# Patient Record
Sex: FEMALE | Race: WHITE
[De-identification: names, ages, dates, MRNs, and addresses within clinical notes are randomized per-mention and may not be internally consistent; named-entity substitution may affect disease eponyms.]

---

## 2017-12-25 ENCOUNTER — HOSPITAL ENCOUNTER (EMERGENCY)
Dept: HOSPITAL 63 - ER | Age: 32
Discharge: HOME | End: 2017-12-25
Payer: OTHER GOVERNMENT

## 2017-12-25 VITALS — DIASTOLIC BLOOD PRESSURE: 78 MMHG | SYSTOLIC BLOOD PRESSURE: 113 MMHG

## 2017-12-25 VITALS — WEIGHT: 160 LBS | BODY MASS INDEX: 26.66 KG/M2 | HEIGHT: 65 IN

## 2017-12-25 DIAGNOSIS — Z91.040: ICD-10-CM

## 2017-12-25 DIAGNOSIS — K58.9: ICD-10-CM

## 2017-12-25 DIAGNOSIS — K51.911: Primary | ICD-10-CM

## 2017-12-25 LAB
ALBUMIN SERPL-MCNC: 4 G/DL (ref 3.4–5)
ALBUMIN/GLOB SERPL: 1.1 {RATIO} (ref 1–1.7)
ALP SERPL-CCNC: 80 U/L (ref 46–116)
ALT SERPL-CCNC: 29 U/L (ref 14–59)
ANION GAP SERPL CALC-SCNC: 8 MMOL/L (ref 6–14)
APTT PPP: YELLOW S
AST SERPL-CCNC: 15 U/L (ref 15–37)
BACTERIA #/AREA URNS HPF: (no result) /HPF
BASOPHILS # BLD AUTO: 0.1 X10^3/UL (ref 0–0.2)
BASOPHILS NFR BLD: 1 % (ref 0–3)
BILIRUB SERPL-MCNC: 0.6 MG/DL (ref 0.2–1)
BILIRUB UR QL STRIP: (no result)
BUN/CREAT SERPL: 16 (ref 6–20)
CA-I SERPL ISE-MCNC: 11 MG/DL (ref 7–20)
CALCIUM SERPL-MCNC: 9 MG/DL (ref 8.5–10.1)
CHLORIDE SERPL-SCNC: 105 MMOL/L (ref 98–107)
CO2 SERPL-SCNC: 27 MMOL/L (ref 21–32)
CREAT SERPL-MCNC: 0.7 MG/DL (ref 0.6–1)
EOSINOPHIL NFR BLD: 0 % (ref 0–3)
EOSINOPHIL NFR BLD: 0 X10^3/UL (ref 0–0.7)
ERYTHROCYTE [DISTWIDTH] IN BLOOD BY AUTOMATED COUNT: 12.6 % (ref 11.5–14.5)
FIBRINOGEN PPP-MCNC: (no result) MG/DL
GFR SERPLBLD BASED ON 1.73 SQ M-ARVRAT: 96.4 ML/MIN
GLOBULIN SER-MCNC: 3.7 G/DL (ref 2.2–3.8)
GLUCOSE SERPL-MCNC: 116 MG/DL (ref 70–99)
GLUCOSE UR STRIP-MCNC: (no result) MG/DL
HCT VFR BLD CALC: 41.1 % (ref 36–47)
HGB BLD-MCNC: 14.4 G/DL (ref 12–15.5)
LYMPHOCYTES # BLD: 0.7 X10^3/UL (ref 1–4.8)
LYMPHOCYTES NFR BLD AUTO: 6 % (ref 24–48)
MCH RBC QN AUTO: 31 PG (ref 25–35)
MCHC RBC AUTO-ENTMCNC: 35 G/DL (ref 31–37)
MCV RBC AUTO: 87 FL (ref 79–100)
MONO #: 0.4 X10^3/UL (ref 0–1.1)
MONOCYTES NFR BLD: 4 % (ref 0–9)
NEUT #: 10 X10^3UL (ref 1.8–7.7)
NEUTROPHILS NFR BLD AUTO: 90 % (ref 31–73)
NITRITE UR QL STRIP: (no result)
PLATELET # BLD AUTO: 261 X10^3/UL (ref 140–400)
POTASSIUM SERPL-SCNC: 4.1 MMOL/L (ref 3.5–5.1)
PROT SERPL-MCNC: 7.7 G/DL (ref 6.4–8.2)
RBC # BLD AUTO: 4.73 X10^6/UL (ref 3.5–5.4)
RBC #/AREA URNS HPF: (no result) /HPF (ref 0–2)
SODIUM SERPL-SCNC: 140 MMOL/L (ref 136–145)
SP GR UR STRIP: 1.01
SQUAMOUS #/AREA URNS LPF: (no result) /LPF
UROBILINOGEN UR-MCNC: 0.2 MG/DL
WBC # BLD AUTO: 11.1 X10^3/UL (ref 4–11)
WBC #/AREA URNS HPF: (no result) /HPF (ref 0–4)

## 2017-12-25 PROCEDURE — 83690 ASSAY OF LIPASE: CPT

## 2017-12-25 PROCEDURE — 81025 URINE PREGNANCY TEST: CPT

## 2017-12-25 PROCEDURE — 80053 COMPREHEN METABOLIC PANEL: CPT

## 2017-12-25 PROCEDURE — 96374 THER/PROPH/DIAG INJ IV PUSH: CPT

## 2017-12-25 PROCEDURE — 36415 COLL VENOUS BLD VENIPUNCTURE: CPT

## 2017-12-25 PROCEDURE — 85025 COMPLETE CBC W/AUTO DIFF WBC: CPT

## 2017-12-25 PROCEDURE — 81001 URINALYSIS AUTO W/SCOPE: CPT

## 2017-12-25 PROCEDURE — 99284 EMERGENCY DEPT VISIT MOD MDM: CPT

## 2017-12-25 NOTE — PHYS DOC
Past History


Past Medical History:  IBS, Other


Past Surgical History:  Other


Smoking:  Non-smoker


Alcohol Use:  Occasionally


Drug Use:  None





Adult General


Chief Complaint


Chief Complaint:  ABDOMINAL PAIN





HPI


HPI





Patient is a 32 year old who presents with complaints of abdominal pain and 

blood per rectum when having loose stools. This episode is similar to previous 

flare ups of ulcerative colitis. She denies any sick contacts, recent 

antibiotics, fever, rashes, vomiting.





Review of Systems


Review of Systems





Constitutional: Denies fever or chills []


Eyes: Denies change in visual acuity, redness, or eye pain []


HENT: Denies nasal congestion or sore throat []


Respiratory: Denies cough or shortness of breath []


Cardiovascular: No chest pain


GI: No abdominal discomfort. Blood per rectum as well as mucus


: Denies dysuria or hematuria []


Musculoskeletal: Denies back pain or joint pain []


Integument: Denies rash or skin lesions []


Neurologic: Denies headache, focal weakness or sensory changes []


a []





All other systems were reviewed and found to be within normal limits, except as 

documented in this note.





Current Medications


Current Medications





Current Medications








 Medications


  (Trade)  Dose


 Ordered  Sig/Citlalli  Start Time


 Stop Time Status Last Admin


Dose Admin


 


 Hyoscyamine


  (Anaspaz)  0.125 mg  1X  ONCE  12/25/17 10:00


 12/25/17 10:02 DC  


 


 


 Methylprednisolone


 Sodium Succinate


  (SOLU-Medrol


 125MG VIAL)  125 mg  1X  ONCE  12/25/17 10:15


 12/25/17 10:16 DC 12/25/17 10:24


125 MG


 


 Morphine Sulfate


  (Morphine 2mg


 Syringe)  2 mg  1X  ONCE  12/25/17 10:00


 12/25/17 10:12 DC  


 











Allergies


Allergies





Allergies








Coded Allergies Type Severity Reaction Last Updated Verified


 


  latex Allergy Unknown  3/31/14 Yes











Physical Exam


Physical Exam





Constitutional: Well developed, well nourished, no acute distress, non-toxic 

appearance. []


HENT: Normocephalic, atraumatic,


Eyes: EOMI, conjunctiva normal, no discharge. [] 


Neck: Normal range of motion, no tenderness, supple, no stridor. [] 


Cardiovascular:Heart rate regular rhythm, no murmur, normal perfusion, equal 

pulses


Lungs & Thorax:  Bilateral breath sounds clear to auscultation, no tachypnea


Abdomen: Bowel sounds normal, soft, very mild tenderness to palpation low 

abdomen without guarding or rebound, no masses, no distention, no pulsatile 

masses. [] 


: Normal tone, no active bleeding. No signs of inflammation or infection in 

the rectal area


Skin: Warm, dry, no erythema, no rash. [] 


Back: No tenderness, no CVA tenderness. [] 


Extremities: No tenderness, , ROM intact, no edema. [] 


Neurologic: Alert and oriented X 3, normal motor function, ambulates with 

normal gait and without difficulty, no focal deficits noted. []


Psychologic: Affect normal, judgement normal, mood normal. []





Current Patient Data


Vital Signs





 Vital Signs








  Date Time  Temp Pulse Resp B/P (MAP) Pulse Ox O2 Delivery O2 Flow Rate FiO2


 


12/25/17 10:30  71 18 113/78 (90) 98 Room Air  


 


12/25/17 09:19 98.2       








Lab Results





 Laboratory Tests








Test


  12/25/17


09:45 12/25/17


09:50 12/25/17


11:09


 


White Blood Count


  11.1 x10^3/uL


(4.0-11.0)  H 


  


 


 


Red Blood Count


  4.73 x10^6/uL


(3.50-5.40) 


  


 


 


Hemoglobin


  14.4 g/dL


(12.0-15.5) 


  


 


 


Hematocrit


  41.1 %


(36.0-47.0) 


  


 


 


Mean Corpuscular Volume


  87 fL ()


  


  


 


 


Mean Corpuscular Hemoglobin 31 pg (25-35)    


 


Mean Corpuscular Hemoglobin


Concent 35 g/dL


(31-37) 


  


 


 


Red Cell Distribution Width


  12.6 %


(11.5-14.5) 


  


 


 


Platelet Count


  261 x10^3/uL


(140-400) 


  


 


 


Neutrophils (%) (Auto) 90 % (31-73)  H  


 


Lymphocytes (%) (Auto) 6 % (24-48)  L  


 


Monocytes (%) (Auto) 4 % (0-9)    


 


Eosinophils (%) (Auto) 0 % (0-3)    


 


Basophils (%) (Auto) 1 % (0-3)    


 


Neutrophils # (Auto)


  10.0 x10^3uL


(1.8-7.7)  H 


  


 


 


Lymphocytes # (Auto)


  0.7 x10^3/uL


(1.0-4.8)  L 


  


 


 


Monocytes # (Auto)


  0.4 x10^3/uL


(0.0-1.1) 


  


 


 


Eosinophils # (Auto)


  0.0 x10^3/uL


(0.0-0.7) 


  


 


 


Basophils # (Auto)


  0.1 x10^3/uL


(0.0-0.2) 


  


 


 


Sodium Level


  140 mmol/L


(136-145) 


  


 


 


Potassium Level


  4.1 mmol/L


(3.5-5.1) 


  


 


 


Chloride Level


  105 mmol/L


() 


  


 


 


Carbon Dioxide Level


  27 mmol/L


(21-32) 


  


 


 


Anion Gap 8 (6-14)    


 


Blood Urea Nitrogen


  11 mg/dL


(7-20) 


  


 


 


Creatinine


  0.7 mg/dL


(0.6-1.0) 


  


 


 


Estimated GFR


(Cockcroft-Gault) 96.4  


  


  


 


 


BUN/Creatinine Ratio 16 (6-20)    


 


Glucose Level


  116 mg/dL


(70-99)  H 


  


 


 


Calcium Level


  9.0 mg/dL


(8.5-10.1) 


  


 


 


Total Bilirubin


  0.6 mg/dL


(0.2-1.0) 


  


 


 


Aspartate Amino Transferase


(AST) 15 U/L (15-37)


  


  


 


 


Alanine Aminotransferase (ALT)


  29 U/L (14-59)


  


  


 


 


Alkaline Phosphatase


  80 U/L


() 


  


 


 


Total Protein


  7.7 g/dL


(6.4-8.2) 


  


 


 


Albumin


  4.0 g/dL


(3.4-5.0) 


  


 


 


Albumin/Globulin Ratio 1.1 (1.0-1.7)    


 


Lipase


  129 U/L


() 


  


 


 


Urine Collection Type  Unknown   


 


Urine Color  Yellow   


 


Urine Clarity  Hazy   


 


Urine pH  6.0   


 


Urine Specific Gravity  1.015   


 


Urine Protein


  


  Neg


(NEG-TRACE) 


 


 


Urine Glucose (UA)


  


  Neg mg/dL


(NEG) 


 


 


Urine Ketones (Stick)


  


  Trace mg/dL


(NEG) 


 


 


Urine Blood  Neg (NEG)   


 


Urine Nitrite  Neg (NEG)   


 


Urine Bilirubin  Neg (NEG)   


 


Urine Urobilinogen Dipstick


  


  0.2 mg/dL (0.2


mg/dL) 


 


 


Urine Leukocyte Esterase  Neg (NEG)   


 


Urine RBC


  


  1-2 /HPF (0-2)


  


 


 


Urine WBC


  


  1-4 /HPF (0-4)


  


 


 


Urine Squamous Epithelial


Cells 


  Many /LPF  


  


 


 


Urine Bacteria


  


  Few /HPF


(0-FEW) 


 


 


Urine Mucus  Marked /LPF   


 


POC Urine HCG, Qualitative


  


  


  hcg negative


(Negative)











EKG


EKG


[]





Radiology/Procedures


Radiology/Procedures


[]





Course & Med Decision Making


Course & Med Decision Making


Pertinent Labs and Imaging studies reviewed. (See chart for details)





1100Patient declines narcotic medication at this time and she says that her 

pain is bearable. Patient feels improved and will follow-up with her doctor as 

an outpatient.





[]





Dragon Disclaimer


Dragon Disclaimer


This electronic medical record was generated, in whole or in part, using a 

voice recognition dictation system.





Departure


Departure:


Impression:  


 Primary Impression:  


 Abdominal pain


 Additional Impressions:  


 Rectal bleeding


 Ulcerative colitis


Condition:  STABLE


Referrals:  


GENA ENGLISH DO (PCP)


Please follow-up in 2 days for recheck and reevaluation


Patient Instructions:  Abdominal Pain (Nonspecific), Rectal Bleeding, Easy-to-

Read, Ulcerative Colitis





Problem Qualifiers











MIKE YAO MD Dec 25, 2017 11:14

## 2018-10-30 ENCOUNTER — HOSPITAL ENCOUNTER (OUTPATIENT)
Dept: HOSPITAL 61 - KCIC MRI | Age: 33
Discharge: HOME | End: 2018-10-30
Attending: FAMILY MEDICINE
Payer: OTHER GOVERNMENT

## 2018-10-30 DIAGNOSIS — K76.89: Primary | ICD-10-CM

## 2018-10-30 PROCEDURE — A9585 GADOBUTROL INJECTION: HCPCS

## 2018-10-30 PROCEDURE — 74183 MRI ABD W/O CNTR FLWD CNTR: CPT

## 2018-10-30 NOTE — [facility]
Indication: Follow-up for liver lesion

 

TECHNIQUE: Axial T2-weighted, axial in and out of phase, axial and coronal

T1-weighted image sequence obtained without IV contrast. After infusion of

7 mL of gadolinium based contrast, T1-weighted axial images were obtained 

at multiple intervals.

 

COMPARISON: This MRI from 1/3/2018

 

FINDINGS:

Heart is normal in size. No pericardial or pleural effusion. Liver is 

normal in morphology. No hepatic steatosis. There is a 4.5 x 3.0 cm T2 

hyperintense/T1 hypointense lesion in segment 3 of the liver with central 

T2 hyperintense scar demonstrating arterial enhancement, becoming 

isointense to the liver on portal venous phase and progressive enhancement

on delayed images. No new liver lesion. Concentrated bile or sludge is 

seen in the gallbladder. Intrinsic T1 signal is preserved in the pancreas 

without focal pancreatic lesion. No intra or extrahepatic biliary duct 

dilation. Adrenal glands show no focal mass. No hydronephrosis or 

suspicious renal lesion. No enlarged retroperitoneal adenopathy. No 

enhancing bone lesion.

 

IMPRESSION:

1. Stable segment 3 liver lesion demonstrating features of focal nodular 

hyperplasia.

 

Electronically signed by: Alexys Deleon DO (10/30/2018 2:01 PM) [facility]

## 2021-03-18 ENCOUNTER — HOSPITAL ENCOUNTER (OUTPATIENT)
Dept: HOSPITAL 61 - KCIC MRI | Age: 36
End: 2021-03-18
Attending: FAMILY MEDICINE
Payer: OTHER GOVERNMENT

## 2021-03-18 DIAGNOSIS — R10.13: Primary | ICD-10-CM

## 2021-03-18 DIAGNOSIS — R16.0: ICD-10-CM

## 2021-03-18 DIAGNOSIS — G89.29: ICD-10-CM

## 2021-03-18 DIAGNOSIS — R10.9: ICD-10-CM

## 2021-03-18 PROCEDURE — 74183 MRI ABD W/O CNTR FLWD CNTR: CPT

## 2021-03-19 NOTE — [facility]
Abdominal MRI with and without IV contrast



INDICATION: Chronic epigastric abdominal wall pain. Hyperplasia.



COMPARISON: Abdominal MRI with and without IV contrast of 1/3/2018 and 10/30/2018.



TECHNIQUE: Multiplanar multisequence MR imaging of the abdomen was performed using 13 mL of clariscan
 for the postcontrast sequences. These include axial diffusion-weighted imaging, axial in and opposed
 phase T1, axial true FISP, coronal fat-sat T2, coronal T1, fat sat pre and postcontrast dynamic T1 a
cquired in the arterial, portal venous and delayed (3 and 6 minutes) phases.



FINDINGS:



Lung bases are clear.



There has been interval enlargement in an arterially enhancing mass in hepatic segment 3 measuring 5.
5 x 4.9 cm on this examination (compared with 4.8 x 3.8 cm on the prior exam) that fades to isointens
ity on the portal venous phase and shows delayed enhancement of the central scar with no evidence of 
lesion washout. No new hepatic lesions are identified. The portal and hepatic venous systems are wong
nt and enhance appropriately.



Hepatobiliary tree shows a contracted gallbladder with no evidence of biliary dilation. No gallstones
 are identified.



Pancreas, adrenals, kidneys and spleen are normal.



No abdominal adenopathy, ascites or fluid collection. 

Major abdominal vessels are normal in caliber and enhance appropriately.

No acute or aggressive osseous lesions.

The abdominal wall shows no evidence of a congenital dehiscence and there is no ventral abdominal wal
l hernia in the included field.



IMPRESSION:

Slight interval enlargement of a left hepatic segment 3 mass consistent with focal nodular hyperplasi
a. This can be symptomatic. Correlate clinically.



Electronically signed by: Andrew Short MD (3/19/2021 11:11 AM) [id number]

## 2021-08-24 ENCOUNTER — HOSPITAL ENCOUNTER (OUTPATIENT)
Dept: HOSPITAL 63 - SURG | Age: 36
Discharge: HOME | End: 2021-08-24
Attending: INTERNAL MEDICINE
Payer: OTHER GOVERNMENT

## 2021-08-24 VITALS
SYSTOLIC BLOOD PRESSURE: 111 MMHG | DIASTOLIC BLOOD PRESSURE: 81 MMHG | DIASTOLIC BLOOD PRESSURE: 81 MMHG | SYSTOLIC BLOOD PRESSURE: 111 MMHG

## 2021-08-24 DIAGNOSIS — R19.4: Primary | ICD-10-CM

## 2021-08-24 DIAGNOSIS — K51.90: ICD-10-CM

## 2021-08-24 DIAGNOSIS — Z20.822: ICD-10-CM

## 2021-08-24 DIAGNOSIS — Z87.891: ICD-10-CM

## 2021-08-24 DIAGNOSIS — K76.89: ICD-10-CM

## 2021-08-24 DIAGNOSIS — Z91.040: ICD-10-CM

## 2021-08-24 DIAGNOSIS — Z79.899: ICD-10-CM

## 2021-08-24 DIAGNOSIS — Z98.890: ICD-10-CM

## 2021-08-24 DIAGNOSIS — K63.89: ICD-10-CM

## 2021-08-24 LAB — U PREG PATIENT: NEGATIVE

## 2021-08-24 PROCEDURE — 45378 DIAGNOSTIC COLONOSCOPY: CPT

## 2021-08-24 PROCEDURE — C9803 HOPD COVID-19 SPEC COLLECT: HCPCS

## 2021-08-24 PROCEDURE — U0003 INFECTIOUS AGENT DETECTION BY NUCLEIC ACID (DNA OR RNA); SEVERE ACUTE RESPIRATORY SYNDROME CORONAVIRUS 2 (SARS-COV-2) (CORONAVIRUS DISEASE [COVID-19]), AMPLIFIED PROBE TECHNIQUE, MAKING USE OF HIGH THROUGHPUT TECHNOLOGIES AS DESCRIBED BY CMS-2020-01-R: HCPCS

## 2021-08-24 PROCEDURE — 81025 URINE PREGNANCY TEST: CPT

## 2021-11-11 ENCOUNTER — HOSPITAL ENCOUNTER (OUTPATIENT)
Dept: HOSPITAL 61 - KCIC MRI | Age: 36
End: 2021-11-11
Attending: FAMILY MEDICINE
Payer: OTHER GOVERNMENT

## 2021-11-11 DIAGNOSIS — D73.89: ICD-10-CM

## 2021-11-11 DIAGNOSIS — N28.1: ICD-10-CM

## 2021-11-11 DIAGNOSIS — R16.0: Primary | ICD-10-CM

## 2021-11-11 PROCEDURE — 74183 MRI ABD W/O CNTR FLWD CNTR: CPT

## 2021-11-11 NOTE — KCIC
MRI ABDOMEN WITH AND WITHOUT CONTRAST-: 11/11/2021 9:40 AM



INDICATION: 36 years old Female. Liver mass



COMPARISON: MRI abdomen 3/18/2021.



TECHNIQUE: Multiplanar multisequence MR imaging of the abdomen was performed both before and after th
e intravenous administration of gadolinium.



FINDINGS:



LUNG BASES: Unremarkable.



ABDOMEN:



LIVER: Normal morphology. No significant difference of hepatic signal intensity between in and out of
 phase images. Stable hepatic lesion within segment III measuring 5.4 x 4.1 cm with isointense T2 sig
nal intensity, arterial hyperenhancement and central delayed scar suggestive of focal nodular hyperpl
zuri. No new hepatic lesion.



BILE DUCTS: There is no intra or extrahepatic biliary ductal dilatation. 



GALLBLADDER: Unremarkable.



PANCREAS: Unremarkable. Normal caliber pancreatic duct.



SPLEEN: Stable nonenhancing lesion within the spleen measuring 10 mm suggestive of a cyst.



ADRENAL GLANDS: Unremarkable. 



KIDNEYS: Inferior pole left renal cyst measures 1.5 cm. No hydronephrosis or suspicious renal mass No
 hydronephrosis.



BOWEL: Normal caliber.



PERITONEUM: No ascites or free air. No fluid collection. 



VASCULATURE: No abdominal aortic aneurysm. Major abdominal veins are patent.



RETROPERITONEUM: Within normal limits.



LYMPH NODES: Within normal limits.



ABDOMINAL WALL: Unremarkable. 



BONES: No suspicious osseous abnormality.





IMPRESSION: 

Stable 5.4 x 4.1 cm mass within segment III with imaging characteristics most suggestive of a focal n
odular hyperplasia. If repeat imaging is considered, imaging with Eovist could be of benefit.



Electronically signed by: Gloria Paige MD (11/11/2021 2:38 PM) [facility]

## 2021-12-29 ENCOUNTER — HOSPITAL ENCOUNTER (OUTPATIENT)
Dept: HOSPITAL 63 - RAD | Age: 36
End: 2021-12-29
Attending: NURSE PRACTITIONER
Payer: OTHER GOVERNMENT
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
## 2021-12-29 DIAGNOSIS — S69.91XA: Primary | ICD-10-CM
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
## 2021-12-29 PROCEDURE — 73110 X-RAY EXAM OF WRIST: CPT

## 2021-12-29 NOTE — RAD
EXAM: Right wrist, 3 views.



HISTORY: Trauma.



COMPARISON: None.



FINDINGS: 3 views of the right wrist are obtained. There is no fracture, dislocation or subluxation.



IMPRESSION: No acute osseous finding.



Electronically signed by: Leonor Conrad MD (12/29/2021 4:43 PM) [id number]